# Patient Record
Sex: FEMALE | Race: WHITE | NOT HISPANIC OR LATINO | Employment: UNEMPLOYED | URBAN - METROPOLITAN AREA
[De-identification: names, ages, dates, MRNs, and addresses within clinical notes are randomized per-mention and may not be internally consistent; named-entity substitution may affect disease eponyms.]

---
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
## 2025-05-28 ENCOUNTER — OFFICE VISIT (OUTPATIENT)
Dept: BARIATRICS | Facility: CLINIC | Age: 20
End: 2025-05-28
Payer: COMMERCIAL

## 2025-05-28 VITALS
DIASTOLIC BLOOD PRESSURE: 62 MMHG | SYSTOLIC BLOOD PRESSURE: 110 MMHG | RESPIRATION RATE: 16 BRPM | WEIGHT: 180 LBS | HEIGHT: 63 IN | HEART RATE: 65 BPM | BODY MASS INDEX: 31.89 KG/M2

## 2025-05-28 DIAGNOSIS — E66.811 OBESITY, CLASS I, BMI 30-34.9: Primary | ICD-10-CM

## 2025-05-28 PROCEDURE — 99203 OFFICE O/P NEW LOW 30 MIN: CPT

## 2025-05-28 RX ORDER — ALBUTEROL SULFATE 90 UG/1
2 INHALANT RESPIRATORY (INHALATION) EVERY 6 HOURS PRN
COMMUNITY

## 2025-05-28 RX ORDER — FLUTICASONE PROPIONATE 110 UG/1
AEROSOL, METERED RESPIRATORY (INHALATION)
COMMUNITY

## 2025-05-28 RX ORDER — ALBUTEROL SULFATE 1.25 MG/3ML
1.25 SOLUTION RESPIRATORY (INHALATION) 3 TIMES DAILY PRN
COMMUNITY

## 2025-05-28 NOTE — ASSESSMENT & PLAN NOTE
- Discussed options of HealthyCORE-Intensive Lifestyle Intervention Program, Very Low Calorie Diet-VLCD, and Conservative Program and the role of weight loss medications.  - Patient is interested in pursuing Conservative Program and follow up visits with medical weight management provider.  - Explained the importance of making lifestyle changes in addition to starting any anti-obesity medications.   - Initial weight loss goal of 5-10% weight loss for improved health. Weight loss can improve patient's co-morbid conditions and/or prevent weight-related complications.  - Weight is not at goal and patient has been unable to achieve a meaningful weight loss above 5% using various programs and tools for more than 6 months  - Labs reviewed from 3/2024- ordered repeats for today     General Recommendations:  Nutrition:  Eat breakfast daily.  Do not skip meals.      Food log (ie.) www.[email or web address].com, sparkpeople.com, loseit.com, calorieking.com, etc.     Practice mindful eating.  Be sure to set aside time to eat, eat slowly, and savor your food.     Hydration:    At least 64oz of water daily.  No sugar sweetened beverages.  No juice (eat the fruit instead).     Exercise:  Studies have shown that the ideal exercise goal is somewhere between 150 to 300 minutes of moderate intensity exercise a week.  Start with exercising 10 minutes every other day and gradually increase physical activity with a goal of at least 150 minutes of moderate intensity exercise a week, divided over at least 3 days a week.  An example of this would be exercising 30 minutes a day, 5 days a week.  Resistance training can increase muscle mass and increase our resting metabolic rate.   FULL BODY resistance training is recommended 2-3 times a week.  Do not do this on consecutive days to allow for muscle recovery.     Aim for a bare minimum 5000 steps, even on days you do not exercise.     Monitoring:   Weigh yourself daily.  If this causes undue stress,  then just weigh yourself once a week.  Weigh yourself the same time of the day with the same amount of clothing on.  Preferably this should be done after waking up, before you eat, and with no clothing or minimal clothing on.     Specific Goals:  Calorie goal:  6821-7209 barbra/day (Provided with meal plan to follow)  -Discussed her nutrition and lifestyle and she is encouraged to continue her physical activity and start to track her food to see where she is in her caloric intake.   -Discussed increasing her protein.  -Discussed staying hydrated.   -Discussed medications: She tried phentermine before and it worked but she gained after she came off.  Discussed GLP 1s, she is interested in trying this as a more long term solution to her weight. She has no CI to other medications.  Patient denies personal history of pancreatitis. Patient also denies personal and family history of medullary thyroid cancer and multiple endocrine neoplasia type 2 (MEN 2 tumor). Patient denies any history of kidney stones, seizures, or glaucoma.   Wegovy Instructions:    - Begin Wegovy 0.25 mg subcutaneously once a week. Dose changes may occur after 4 doses if medication is tolerated. You will be assessed prior to each dose change to make sure you are tolerating the medication well.  - Please message me when you have 2 pens left from the prescription so there are no lapses in treatment.  - If you have been off the medication for more than 14 days please contact the office as you will need to restart the titration at the starting dose again to avoid significant side effects or adverse events.  - Visit wegovy.com for further information/injection instructions.   -Please eat small frequent meals to help reduce nausea. Lemon water and saltine crackers may help with this.   - Side effects of Wegovy discussed: nausea, vomiting, diarrhea, and constipation. If you experience fever, nausea/vomiting, and pain radiating to your back this may be a sign  of pancreatitis. Please go to the emergency room if this occurs.  - Patient understands the side effects of the medication and proper administration. Patient agrees with the treatment plan and all questions were answered. Medication consent was reviewed today and all questions were answered.  Patient agreed with all bulleted points.  Consent was signed, scanned into chart and patient was provided with a copy for their records.

## 2025-05-28 NOTE — PROGRESS NOTES
Assessment/Plan:    Obesity, Class I, BMI 30-34.9  - Discussed options of HealthyCORE-Intensive Lifestyle Intervention Program, Very Low Calorie Diet-VLCD, and Conservative Program and the role of weight loss medications.  - Patient is interested in pursuing Conservative Program and follow up visits with medical weight management provider.  - Explained the importance of making lifestyle changes in addition to starting any anti-obesity medications.   - Initial weight loss goal of 5-10% weight loss for improved health. Weight loss can improve patient's co-morbid conditions and/or prevent weight-related complications.  - Weight is not at goal and patient has been unable to achieve a meaningful weight loss above 5% using various programs and tools for more than 6 months  - Labs reviewed from 3/2024- ordered repeats for today     General Recommendations:  Nutrition:  Eat breakfast daily.  Do not skip meals.      Food log (ie.) [email or web address], sparkpeople.com, loseit.com, calorieking.com, etc.     Practice mindful eating.  Be sure to set aside time to eat, eat slowly, and savor your food.     Hydration:    At least 64oz of water daily.  No sugar sweetened beverages.  No juice (eat the fruit instead).     Exercise:  Studies have shown that the ideal exercise goal is somewhere between 150 to 300 minutes of moderate intensity exercise a week.  Start with exercising 10 minutes every other day and gradually increase physical activity with a goal of at least 150 minutes of moderate intensity exercise a week, divided over at least 3 days a week.  An example of this would be exercising 30 minutes a day, 5 days a week.  Resistance training can increase muscle mass and increase our resting metabolic rate.   FULL BODY resistance training is recommended 2-3 times a week.  Do not do this on consecutive days to allow for muscle recovery.     Aim for a bare minimum 5000 steps, even on days you do not exercise.     Monitoring:    Weigh yourself daily.  If this causes undue stress, then just weigh yourself once a week.  Weigh yourself the same time of the day with the same amount of clothing on.  Preferably this should be done after waking up, before you eat, and with no clothing or minimal clothing on.     Specific Goals:  Calorie goal:  3788-4944 barbra/day (Provided with meal plan to follow)  -Discussed her nutrition and lifestyle and she is encouraged to continue her physical activity and start to track her food to see where she is in her caloric intake.   -Discussed increasing her protein.  -Discussed staying hydrated.   -Discussed medications: She tried phentermine before and it worked but she gained after she came off.  Discussed GLP 1s, she is interested in trying this as a more long term solution to her weight. She has no CI to other medications.  Patient denies personal history of pancreatitis. Patient also denies personal and family history of medullary thyroid cancer and multiple endocrine neoplasia type 2 (MEN 2 tumor). Patient denies any history of kidney stones, seizures, or glaucoma.   Wegovy Instructions:    - Begin Wegovy 0.25 mg subcutaneously once a week. Dose changes may occur after 4 doses if medication is tolerated. You will be assessed prior to each dose change to make sure you are tolerating the medication well.  - Please message me when you have 2 pens left from the prescription so there are no lapses in treatment.  - If you have been off the medication for more than 14 days please contact the office as you will need to restart the titration at the starting dose again to avoid significant side effects or adverse events.  - Visit [email or web address].com for further information/injection instructions.   -Please eat small frequent meals to help reduce nausea. Lemon water and saltine crackers may help with this.   - Side effects of Wegovy discussed: nausea, vomiting, diarrhea, and constipation. If you experience fever,  nausea/vomiting, and pain radiating to your back this may be a sign of pancreatitis. Please go to the emergency room if this occurs.  - Patient understands the side effects of the medication and proper administration. Patient agrees with the treatment plan and all questions were answered. Medication consent was reviewed today and all questions were answered.  Patient agreed with all bulleted points.  Consent was signed, scanned into chart and patient was provided with a copy for their records.          Sydnie was seen today for consult.    Diagnoses and all orders for this visit:    Obesity, Class I, BMI 30-34.9  -     Hemoglobin A1C; Future  -     TSH, 3rd generation with Free T4 reflex; Future  -     Lipid panel; Future  -     Comprehensive metabolic panel; Future  -     CBC and differential; Future  -     Insulin, fasting; Future  -     Semaglutide-Weight Management (WEGOVY) 0.25 MG/0.5ML; Inject 0.5 mL (0.25 mg total) under the skin once a week           Total time spent reviewing chart, interviewing patient, examining patient, discussing plan, answering all questions, and documentin min, with >50% face-to-face time spent counseling patient on nonsurgical interventions for the treatment of excess weight. Discussed in detail nonsurgical options including intensive lifestyle intervention program, very low-calorie diet program and conservative program.  Discussed the role of weight loss medications.  Counseled patient on diet behavior and exercise modification for weight loss.    Follow up in approximately 3 months with Non-Surgical Physician/Advanced Practitioner.    Subjective:   Chief Complaint   Patient presents with    Consult     Initial visit with perez       Patient ID: Sydnie Teixeira  is a 19 y.o. female with excess weight/obesity here to pursue weight management.  Previous notes and records have been reviewed.    Past Medical History[1]  Past Surgical History[2]    HPI:  Wt Readings from  Last 20 Encounters:   25 81.6 kg (180 lb) (94%, Z= 1.60)*   18 48.1 kg (106 lb) (55%, Z= 0.12)*   03/31/15 30.4 kg (67 lb) (44%, Z= -0.14)*     * Growth percentiles are based on CDC (Girls, 2-20 Years) data.       Patient presents today to medical weight management office for consult. She is a physically active 18 y/o with history of asthma. She is a twin and she is 40 pounds heavier than her sister despite that they both eat the same foods and same amount. She is a runner and does physical training of some sort almost every day and her sister does not. She has been trying to lose weight for over a year now and has become very frustrated because she has not seen progress. She tried phentermine in the past and was successful but was unable to maintain after she came off.   She endorses regular periods and normal flow. However she has not seen a gyn yet at all.       Starting MWM weight: 180 lbs   Starting BMI: 31  Starting Waist Measurement: 35 inches   Goal weight: 140s    Obesity/Excess Weight:  Severity: Moderate  Onset:  since highschool    Modifiers: Diet and Exercise and Prescription Weight Loss Medications  Contributing factors: Poor Food Choices, Insufficient Physical Activity, Stress/Emotional Eating, Lack of knowledge of appropriate lifestyle changes, and Insufficient time to make appropriate lifestyle changes  Associated symptoms: fatigue, increased joint pain, decreased self esteem, decreased mobility, and clothes do not fit    Diet recall:  B: oatmeal   S:   L: half of a sandwich with deli meat   S: apple sauce and fruit   D: chicken and veg and starch   S:   Take out frequency: occasionally     Hydration: Water maybe not enough   Alcohol: no  Smoking: no   Exercise: Run and muscle training   Occupation: Summer camp   Sleep: 6-7 hours a night   STOP ban/8        The following portions of the patient's history were reviewed and updated as appropriate: allergies, current medications, past  "family history, past medical history, past social history, past surgical history, and problem list.    Family History[3]     Review of Systems   Constitutional:  Negative for fatigue.   HENT:  Negative for sore throat.    Respiratory:  Negative for cough and shortness of breath.    Cardiovascular:  Negative for chest pain, palpitations and leg swelling.   Gastrointestinal:  Negative for abdominal pain, constipation, diarrhea, nausea and vomiting.   Genitourinary:  Negative for dysuria.   Musculoskeletal:  Negative for arthralgias and back pain.   Skin:  Negative for rash.   Neurological:  Negative for headaches.   Psychiatric/Behavioral:  Negative for dysphoric mood. The patient is not nervous/anxious.        Objective:  /62 (BP Location: Left arm, Patient Position: Sitting, Cuff Size: Large)   Pulse 65   Resp 16   Ht 5' 2.91\" (1.598 m)   Wt 81.6 kg (180 lb)   BMI 31.97 kg/m²     Physical Exam  Vitals and nursing note reviewed.   Constitutional:       Appearance: Normal appearance. She is obese.   HENT:      Head: Normocephalic.   Pulmonary:      Effort: Pulmonary effort is normal.     Neurological:      Mental Status: She is oriented to person, place, and time.     Psychiatric:         Mood and Affect: Mood normal.         Behavior: Behavior normal.         Thought Content: Thought content normal.         Judgment: Judgment normal.              Labs and Imaging  Recent labs and imaging have been personally reviewed.  Lab Results   Component Value Date    WBC 6.27 03/13/2024    HGB 13.3 03/13/2024    HCT 39.8 03/13/2024    MCV 90 03/13/2024     03/13/2024     Lab Results   Component Value Date     11/27/2015    SODIUM 139 12/31/2021    K 3.9 12/31/2021     12/31/2021    CO2 28 12/31/2021    ANIONGAP 10 11/27/2015    AGAP 7 12/31/2021    BUN 9 12/31/2021    CREATININE 0.84 12/31/2021    GLUF 92 12/31/2021    CALCIUM 8.5 12/31/2021    AST 20 12/31/2021    ALT 10 (L) 12/31/2021    ALKPHOS " 82 12/31/2021    PROT 7.7 11/27/2015    TP 7.4 12/31/2021    BILITOT 0.34 11/27/2015    TBILI 0.43 12/31/2021     Lab Results   Component Value Date    HGBA1C 5.5 03/13/2024     Lab Results   Component Value Date    LDG0DHXWSJUK 2.699 03/13/2024     Lab Results   Component Value Date    CHOLESTEROL 150 03/13/2024     Lab Results   Component Value Date    HDL 53 03/13/2024     Lab Results   Component Value Date    TRIG 78 03/13/2024     Lab Results   Component Value Date    LDLCALC 81 03/13/2024          [1]   Past Medical History:  Diagnosis Date    Allergic     Asthma    [2]   Past Surgical History:  Procedure Laterality Date    NO PAST SURGERIES     [3]   Family History  Problem Relation Name Age of Onset    Hypertension Father      Diabetes Paternal Grandmother      Diabetes Paternal Grandfather

## 2025-05-29 ENCOUNTER — TELEPHONE (OUTPATIENT)
Dept: BARIATRICS | Facility: CLINIC | Age: 20
End: 2025-05-29

## 2025-06-02 NOTE — TELEPHONE ENCOUNTER
Wegovy Approved 5/29/25-5/29/26.  Pharmacy was notified, patient picked up medication 5/31/25.  Patient co-pay $78.26.

## 2025-06-14 ENCOUNTER — APPOINTMENT (OUTPATIENT)
Dept: LAB | Facility: CLINIC | Age: 20
End: 2025-06-14
Payer: COMMERCIAL

## 2025-06-14 DIAGNOSIS — E66.811 OBESITY, CLASS I, BMI 30-34.9: ICD-10-CM

## 2025-06-14 LAB
ALBUMIN SERPL BCG-MCNC: 4.4 G/DL (ref 3.5–5)
ALP SERPL-CCNC: 52 U/L (ref 34–104)
ALT SERPL W P-5'-P-CCNC: 5 U/L (ref 7–52)
ANION GAP SERPL CALCULATED.3IONS-SCNC: 4 MMOL/L (ref 4–13)
AST SERPL W P-5'-P-CCNC: 17 U/L (ref 13–39)
BASOPHILS # BLD AUTO: 0.06 THOUSANDS/ÂΜL (ref 0–0.1)
BASOPHILS NFR BLD AUTO: 1 % (ref 0–1)
BILIRUB SERPL-MCNC: 0.44 MG/DL (ref 0.2–1)
BUN SERPL-MCNC: 13 MG/DL (ref 5–25)
CALCIUM SERPL-MCNC: 9.1 MG/DL (ref 8.4–10.2)
CHLORIDE SERPL-SCNC: 107 MMOL/L (ref 96–108)
CHOLEST SERPL-MCNC: 140 MG/DL (ref ?–200)
CO2 SERPL-SCNC: 28 MMOL/L (ref 21–32)
CREAT SERPL-MCNC: 0.83 MG/DL (ref 0.6–1.3)
EOSINOPHIL # BLD AUTO: 0.36 THOUSAND/ÂΜL (ref 0–0.61)
EOSINOPHIL NFR BLD AUTO: 8 % (ref 0–6)
ERYTHROCYTE [DISTWIDTH] IN BLOOD BY AUTOMATED COUNT: 12.6 % (ref 11.6–15.1)
EST. AVERAGE GLUCOSE BLD GHB EST-MCNC: 111 MG/DL
GFR SERPL CREATININE-BSD FRML MDRD: 102 ML/MIN/1.73SQ M
GLUCOSE P FAST SERPL-MCNC: 90 MG/DL (ref 65–99)
HBA1C MFR BLD: 5.5 %
HCT VFR BLD AUTO: 35.5 % (ref 34.8–46.1)
HDLC SERPL-MCNC: 51 MG/DL
HGB BLD-MCNC: 12.4 G/DL (ref 11.5–15.4)
IMM GRANULOCYTES # BLD AUTO: 0.01 THOUSAND/UL (ref 0–0.2)
IMM GRANULOCYTES NFR BLD AUTO: 0 % (ref 0–2)
INSULIN SERPL-ACNC: 6.37 UIU/ML (ref 1.9–23)
LDLC SERPL CALC-MCNC: 79 MG/DL (ref 0–100)
LYMPHOCYTES # BLD AUTO: 2.18 THOUSANDS/ÂΜL (ref 0.6–4.47)
LYMPHOCYTES NFR BLD AUTO: 46 % (ref 14–44)
MCH RBC QN AUTO: 32 PG (ref 26.8–34.3)
MCHC RBC AUTO-ENTMCNC: 34.9 G/DL (ref 31.4–37.4)
MCV RBC AUTO: 92 FL (ref 82–98)
MONOCYTES # BLD AUTO: 0.35 THOUSAND/ÂΜL (ref 0.17–1.22)
MONOCYTES NFR BLD AUTO: 8 % (ref 4–12)
NEUTROPHILS # BLD AUTO: 1.7 THOUSANDS/ÂΜL (ref 1.85–7.62)
NEUTS SEG NFR BLD AUTO: 37 % (ref 43–75)
NONHDLC SERPL-MCNC: 89 MG/DL
NRBC BLD AUTO-RTO: 0 /100 WBCS
PLATELET # BLD AUTO: 233 THOUSANDS/UL (ref 149–390)
PMV BLD AUTO: 9.4 FL (ref 8.9–12.7)
POTASSIUM SERPL-SCNC: 4.1 MMOL/L (ref 3.5–5.3)
PROT SERPL-MCNC: 7.1 G/DL (ref 6.4–8.4)
RBC # BLD AUTO: 3.88 MILLION/UL (ref 3.81–5.12)
SODIUM SERPL-SCNC: 139 MMOL/L (ref 135–147)
TRIGL SERPL-MCNC: 48 MG/DL (ref ?–150)
TSH SERPL DL<=0.05 MIU/L-ACNC: 2.25 UIU/ML (ref 0.45–4.5)
WBC # BLD AUTO: 4.66 THOUSAND/UL (ref 4.31–10.16)

## 2025-06-14 PROCEDURE — 80053 COMPREHEN METABOLIC PANEL: CPT

## 2025-06-14 PROCEDURE — 83525 ASSAY OF INSULIN: CPT

## 2025-06-14 PROCEDURE — 80061 LIPID PANEL: CPT

## 2025-06-14 PROCEDURE — 83036 HEMOGLOBIN GLYCOSYLATED A1C: CPT

## 2025-06-14 PROCEDURE — 36415 COLL VENOUS BLD VENIPUNCTURE: CPT

## 2025-06-14 PROCEDURE — 84443 ASSAY THYROID STIM HORMONE: CPT

## 2025-06-14 PROCEDURE — 85025 COMPLETE CBC W/AUTO DIFF WBC: CPT

## 2025-06-16 ENCOUNTER — RESULTS FOLLOW-UP (OUTPATIENT)
Dept: BARIATRICS | Facility: CLINIC | Age: 20
End: 2025-06-16

## 2025-06-16 DIAGNOSIS — E66.811 OBESITY, CLASS I, BMI 30-34.9: Primary | ICD-10-CM

## 2025-06-16 DIAGNOSIS — E66.811 OBESITY, CLASS I, BMI 30-34.9: ICD-10-CM

## 2025-07-07 DIAGNOSIS — E66.811 OBESITY, CLASS I, BMI 30-34.9: Primary | ICD-10-CM

## 2025-08-04 DIAGNOSIS — E66.811 OBESITY, CLASS I, BMI 30-34.9: Primary | ICD-10-CM

## 2025-08-15 ENCOUNTER — TELEMEDICINE (OUTPATIENT)
Age: 20
End: 2025-08-15
Payer: COMMERCIAL